# Patient Record
Sex: MALE | URBAN - METROPOLITAN AREA
[De-identification: names, ages, dates, MRNs, and addresses within clinical notes are randomized per-mention and may not be internally consistent; named-entity substitution may affect disease eponyms.]

---

## 2020-08-20 ENCOUNTER — OFFICE VISIT CONVERTED (OUTPATIENT)
Dept: ORTHOPEDIC SURGERY | Facility: CLINIC | Age: 35
End: 2020-08-20
Attending: ORTHOPAEDIC SURGERY

## 2021-05-10 NOTE — H&P
History and Physical      Patient Name: Danie Brown   Patient ID: [id number]   Sex: Male   YOB: 1985        Visit Date: August 20, 2020    Provider: Cameron Black MD   Location: [facility]   Location Address: [address] [place] [address]  [id number]   Location Phone: [phone]          Chief Complaint  · right knee pain      History Of Present Illness  Danie Brown is a 34 year old male who presents today to [facility].      The patient presents today for evaluation of right knee pain. Patient reports having knee issues for years and was told he had runner's knee. He underwent an MRI in 2017 and was told to do therapy. He was then sent to [facility] and has continued pain. He was sent to physical therapy multiple times, given Naproxen. He still has pain and underwent an MRI recently. He reports the pain is around the patella and has pain with increasing prolonged pressure. He denies no current popping or catching.             Past Medical History  ***No Significant Medical History         Past Surgical History  I have had no surgeries         Medication List  naproxen 500 mg oral tablet         Allergy List  NO KNOWN DRUG ALLERGIES       Allergies Reconciled  Family Medical History  Diabetes, unspecified type         Social History  Alcohol Use (Current some day); Claustophobic (Unknown); lives with children; lives with spouse; .; Recreational Drug Use (Never); Tobacco (Never); Working         Review of Systems  · Constitutional  o Denies  o : fever, chills, weight loss  · Cardiovascular  o Denies  o : chest pain, shortness of breath  · Gastrointestinal  o Denies  o : liver disease, heartburn, nausea, blood in stools  · Genitourinary  o Denies  o : painful urination, blood in urine  · Integument  o Denies  o : rash, itching  · Neurologic  o Denies  o : headache, weakness, loss of consciousness  · Musculoskeletal  o Denies  o : painful, swollen  "joints  · Psychiatric  o Denies  o : drug/alcohol addiction, anxiety, depression      Vitals  Date Time BP Position Site L\R Cuff Size HR RR TEMP (F) WT  HT  BMI kg/m2 BSA m2 O2 Sat        08/20/2020 03:38 PM      76 - R   204lbs 16oz 6'  2\" 26.32 2.2 98 %          Physical Examination  · Constitutional  o Appearance  o : well developed, well-nourished, no obvious deformities present  · Head and Face  o Head  o :   § Inspection  § : normocephalic  o Face  o :   § Inspection  § : no facial lesions  · Eyes  o Conjunctivae  o : conjunctivae normal  o Sclerae  o : sclerae white  · Ears, Nose, Mouth and Throat  o Ears  o :   § External Ears  § : appearance within normal limits  § Hearing  § : intact  o Nose  o :   § External Nose  § : appearance normal  · Neck  o Inspection/Palpation  o : normal appearance  o Range of Motion  o : full range of motion  · Respiratory  o Respiratory Effort  o : breathing unlabored  o Inspection of Chest  o : normal appearance  o Auscultation of Lungs  o : no audible wheezing or rales  · Cardiovascular  o Heart  o : regular rate  · Gastrointestinal  o Abdominal Examination  o : soft and non-tender  · Skin and Subcutaneous Tissue  o General Inspection  o : intact, no rashes  · Psychiatric  o General  o : Alert and oriented x3  o Judgement and Insight  o : judgment and insight intact  o Mood and Affect  o : mood normal, affect appropriate  · Right Knee  o Inspection  o : Non-tender to palpation, no skin discoloration, full extension, flexion 135 degrees, stable to anterior and posterior drawer, stable to varus and valgus stress, negative Enmanuel's, negative Lachman's, normal patella mobility, Non-tender to palpation over patella, sensation intact, Neurovascularly intact, full weight bearing  · Imaging  o Imaging  o : MRI [facility] August 2020: 1. No evidence of meniscal tear or ligamentous injury. 2. The patella is slightly high riding (patella job). The patellar articular cartilage " appears intact.           Assessment  · Right knee pain, unspecified chronicity     719.46/M25.561  · Patella job     755.64/Q68.2  · Patellar maltracking     719.86/M22.8X9  · Patellofemoral pain syndrome     719.46/M22.2X9      Plan  · Medications  o Medications have been Reconciled  o Transition of Care or Provider Policy  · Instructions  o Reviewed the patient's Past Medical, Social, and Family history as well as the ROS at today's visit, no changes.  o Call or return if worsening symptoms.  o The above service was scribed by Lucia Woo on my behalf and I attest to the accuracy of the note. jsb  o Diagnosis and treatment options discussed. We recommended conservative treatment with injections (steroid versus viscosupplementation), medications, exercise/physical therapy and activity modifications. He expressed understanding and wished to proceed with injection approval, home exercises and non-running profile. Patient has chronic patellofemoral pain and possible patella maltracking and recommended a non-running profile. He will follow-up once the injection is approved.  · Referrals  o ID: [id number] Date: 08/20/2020 Type: Inbound  Specialty: Orthopedic Surgery            Electronically Signed by: Lucia Woo-, Other -Author on August 21, 2020 06:18:44 AM  Electronically Co-signed by: Cameron Black MD -Reviewer on August 23, 2020 09:25:09 PM

## 2021-05-14 VITALS — HEIGHT: 74 IN | HEART RATE: 76 BPM | BODY MASS INDEX: 26.31 KG/M2 | OXYGEN SATURATION: 98 % | WEIGHT: 205 LBS
